# Patient Record
Sex: MALE | Race: WHITE | Employment: FULL TIME | URBAN - METROPOLITAN AREA
[De-identification: names, ages, dates, MRNs, and addresses within clinical notes are randomized per-mention and may not be internally consistent; named-entity substitution may affect disease eponyms.]

---

## 2024-07-17 ENCOUNTER — PROCEDURE VISIT (OUTPATIENT)
Dept: AUDIOLOGY | Age: 54
End: 2024-07-17
Payer: COMMERCIAL

## 2024-07-17 ENCOUNTER — OFFICE VISIT (OUTPATIENT)
Dept: ENT CLINIC | Age: 54
End: 2024-07-17
Payer: COMMERCIAL

## 2024-07-17 VITALS
HEART RATE: 83 BPM | WEIGHT: 160 LBS | DIASTOLIC BLOOD PRESSURE: 92 MMHG | BODY MASS INDEX: 28.35 KG/M2 | HEIGHT: 63 IN | SYSTOLIC BLOOD PRESSURE: 132 MMHG

## 2024-07-17 DIAGNOSIS — H93.8X1 EAR PRESSURE, RIGHT: ICD-10-CM

## 2024-07-17 DIAGNOSIS — H93.13 TINNITUS OF BOTH EARS: ICD-10-CM

## 2024-07-17 DIAGNOSIS — H91.21 SUDDEN RIGHT HEARING LOSS: Primary | ICD-10-CM

## 2024-07-17 DIAGNOSIS — H90.3 ASYMMETRIC SNHL (SENSORINEURAL HEARING LOSS): ICD-10-CM

## 2024-07-17 DIAGNOSIS — H90.3 SENSORINEURAL HEARING LOSS, BILATERAL: Primary | ICD-10-CM

## 2024-07-17 PROCEDURE — 92567 TYMPANOMETRY: CPT | Performed by: AUDIOLOGIST

## 2024-07-17 PROCEDURE — 92557 COMPREHENSIVE HEARING TEST: CPT | Performed by: AUDIOLOGIST

## 2024-07-17 PROCEDURE — 99204 OFFICE O/P NEW MOD 45 MIN: CPT | Performed by: OTOLARYNGOLOGY

## 2024-07-17 RX ORDER — PREDNISONE 20 MG/1
TABLET ORAL
COMMUNITY
Start: 2024-07-16 | End: 2024-07-17 | Stop reason: ALTCHOICE

## 2024-07-17 RX ORDER — PREDNISONE 10 MG/1
TABLET ORAL
Qty: 39 TABLET | Refills: 0 | Status: SHIPPED | OUTPATIENT
Start: 2024-07-17

## 2024-07-17 ASSESSMENT — ENCOUNTER SYMPTOMS
TROUBLE SWALLOWING: 0
SHORTNESS OF BREATH: 0
FACIAL SWELLING: 0
SINUS PRESSURE: 0
COUGH: 0
EYE ITCHING: 0
APNEA: 0
SORE THROAT: 0
VOICE CHANGE: 0

## 2024-07-17 NOTE — PROGRESS NOTES
[facility]  [address], [address]  [place] [address]  P: [phone]       Patient     Barrett Solis  1970    ChiefComplaint     Chief Complaint   Patient presents with    Hearing Problem     Sudden hearing loss in right ear since 7/13. No ear pain or dizziness.       History of Present Illness     Barrett is a 53-year-old male here today with evaluation of sudden hearing loss in 7/13/2024.  States he woke up in the morning with hearing loss in the right ear.  Denies associated vertigo.  Longstanding bilateral tinnitus unchanged.  No preceding URI or head trauma.    Past Medical History     Past Medical History:   Diagnosis Date    Chronic pain     shoulders/lower back    Thyroid disease     Wears glasses        Past Surgical History     Past Surgical History:   Procedure Laterality Date    SHOULDER ARTHROSCOPY Left 12/4/15    WISDOM TOOTH EXTRACTION  2007       Family History     History reviewed. No pertinent family history.    Social History     Social History     Tobacco Use    Smoking status: Never    Smokeless tobacco: Never   Vaping Use    Vaping Use: Never used   Substance Use Topics    Alcohol use: Yes     Alcohol/week: 6.0 standard drinks of alcohol     Types: 6 Cans of beer per week    Drug use: No        Allergies     No Known Allergies    Medications     Current Outpatient Medications   Medication Sig Dispense Refill    predniSONE (DELTASONE) 10 MG tablet 6 tabs x 4 days, 5 tabs x1 day, 4 tabs x1 day, 3 tabs x1 day, 2 tabs x1 day, 1 tab x1 day 39 tablet 0    DICLOFENAC PO Take by mouth 2 times daily      levothyroxine (SYNTHROID) 88 MCG tablet Take 1 tablet by mouth Daily      METHOCARBAMOL PO Take 1,000 mg by mouth daily as needed Muscle spasms/in the morning      Armodafinil (NUVIGIL) 150 MG TABS tablet Take 1 tablet by mouth once a week. Shift work fatigue       No current facility-administered medications for this visit.       Review of Systems     Review of

## 2024-07-17 NOTE — PROGRESS NOTES
Barrett Solis   1970, 53 y.o. male   [id number]       Referring Provider: Xin Melissa DO  Referral Type: In an order in Epic    Reason for Visit: Evaluation of the cause of disorders of hearing, tinnitus, or balance.    ADULT AUDIOLOGIC EVALUATION      Barrett Solis is a 53 y.o. male seen today, 7/17/2024 , for an initial audiologic evaluation.  Patient was seen by Xin Melissa DO following today's evaluation. Patient was alone.    AUDIOLOGIC AND OTHER PERTINENT MEDICAL HISTORY:      Barrett Solis reported sudden hearing loss and \"plugged\" sensation in the right ear beginning on 7/13/2024. He noted pre-existing bilateral tinnitus that worsens as the day progresses. He reports a history of occupational  noise exposure.     Barrett Solis denied dizziness, history of head trauma, history of ear surgery, and family history of hearing loss.    Date: 7/17/2024     IMPRESSIONS:      Tympanometry consistent with normal middle ear pressure and compliance, bilaterally. Abnormal asymmetric hearing sensitivity in the left ear 1,500-8,000 Hz in the left ear and 250-8,000 Hz in the right ear, which can affect listening needs. Word understanding was excellent, bilaterally. Hearing aids are recommended upon medical clearance. Follow medical recommendations of Xin Melissa DO.     ASSESSMENT AND FINDINGS:     Otoscopy revealed: Clear ear canals bilaterally    RIGHT EAR:  Hearing Sensitivity: Moderate to severe sensorineural hearing loss 250-8,000 Hz   Speech Recognition Threshold: 45 dB HL  Word Recognition:Excellent (92%), based on NU-6 25-word list at 75 dBHL with 45 dBHL of masking using recorded speech stimuli.    Tympanometry: Normal peak pressure and compliance, Type A tympanogram, consistent with normal middle ear function.      LEFT EAR:  Hearing Sensitivity: Normal sloping to severe sensorineural hearing loss 250-8,000 Hz  Speech Recognition Threshold: 15 dB HL  Word Recognition: Excellent

## 2024-07-24 ENCOUNTER — TELEPHONE (OUTPATIENT)
Dept: ENT CLINIC | Age: 54
End: 2024-07-24

## 2024-07-24 ENCOUNTER — PROCEDURE VISIT (OUTPATIENT)
Dept: AUDIOLOGY | Age: 54
End: 2024-07-24
Payer: COMMERCIAL

## 2024-07-24 ENCOUNTER — OFFICE VISIT (OUTPATIENT)
Dept: ENT CLINIC | Age: 54
End: 2024-07-24
Payer: COMMERCIAL

## 2024-07-24 VITALS
WEIGHT: 160 LBS | DIASTOLIC BLOOD PRESSURE: 88 MMHG | SYSTOLIC BLOOD PRESSURE: 139 MMHG | BODY MASS INDEX: 28.35 KG/M2 | HEART RATE: 70 BPM | HEIGHT: 63 IN

## 2024-07-24 DIAGNOSIS — H93.13 TINNITUS OF BOTH EARS: ICD-10-CM

## 2024-07-24 DIAGNOSIS — H90.3 SENSORINEURAL HEARING LOSS, BILATERAL: Primary | ICD-10-CM

## 2024-07-24 DIAGNOSIS — H91.21 SUDDEN RIGHT HEARING LOSS: Primary | ICD-10-CM

## 2024-07-24 DIAGNOSIS — H90.3 SENSORINEURAL HEARING LOSS (SNHL) OF BOTH EARS: ICD-10-CM

## 2024-07-24 PROCEDURE — 92567 TYMPANOMETRY: CPT | Performed by: AUDIOLOGIST

## 2024-07-24 PROCEDURE — 92557 COMPREHENSIVE HEARING TEST: CPT | Performed by: AUDIOLOGIST

## 2024-07-24 PROCEDURE — 99214 OFFICE O/P EST MOD 30 MIN: CPT | Performed by: OTOLARYNGOLOGY

## 2024-07-24 ASSESSMENT — ENCOUNTER SYMPTOMS
SORE THROAT: 0
EYE ITCHING: 0
SHORTNESS OF BREATH: 0
VOICE CHANGE: 0
SINUS PRESSURE: 0
APNEA: 0
TROUBLE SWALLOWING: 0
COUGH: 0
FACIAL SWELLING: 0

## 2024-07-24 NOTE — PROGRESS NOTES
Barrett Solis   1970, 53 y.o. male   [id number]       Referring Provider: Xin Melissa DO  Referral Type: In an order in Epic    Reason for Visit: Determination of the effect of medication, surgery, or other treatment.    ADULT AUDIOLOGIC EVALUATION      Barrett Solis is a 53 y.o. male seen today, 7/24/2024 , for a recheck audiologic evaluation.  Patient was seen by Xin Melissa DO following today's evaluation. Patient was alone.    AUDIOLOGIC AND OTHER PERTINENT MEDICAL HISTORY:      Barrett Solis noted tinnitus.  Patient reports significant improvement in right ear hearing since previous audiologic evaluation on 07/17/2024.  He stated bilateral tinnitus has returned to baseline.    Barrett Solis denied otalgia, aural fullness, and dizziness.    Date: 7/24/2024     IMPRESSIONS:      Normal middle ear pressure and compliance, bilaterally.  Abnormal symmetrical hearing loss, bilaterally, which can affect every day listening needs.  Word understanding was excellent presented at elevated sensation levels, bilaterally.  Significant improvement in right ear hearing since previous audiologic evaluation.  Hearing aids are recommended at this time.  Follow medical recommendations of Xin Melissa DO.     ASSESSMENT AND FINDINGS:     Otoscopy revealed: Clear ear canals bilaterally    RIGHT EAR:  Hearing Sensitivity: Normal hearing sensitivity to a severe sensorineural hearing loss from 250-8000 Hz  Speech Recognition Threshold: 15 dB HL  Word Recognition:Excellent (100%), based on NU-6 25-word list at 60 dBHL using recorded speech stimuli.    Tympanometry: Normal peak pressure and compliance, Type A tympanogram, consistent with normal middle ear function.      LEFT EAR:  Hearing Sensitivity: Normal hearing sensitivity to a severe sensorineural hearing loss from 250-8000 Hz  Speech Recognition Threshold: 10 dB HL  Word Recognition: Excellent (96%), based on NU-6 25-word list at 55 dBHL using recorded

## 2024-07-24 NOTE — TELEPHONE ENCOUNTER
Patient called back and stated he would like to ahead get MRI done. Please place order.   Patient wants to go to [facility] in [facility] (fax [phone])     Wife states [employer] insurance will require a prior authorization to be completed. The PA will fall on our office to complete since he wants to go outside of [facility] to have this done.     Wife asks that you keep her updated on progress of PA and let them know when they should call [facility] to schedule appt for MRI     [phone]

## 2024-07-24 NOTE — PROGRESS NOTES
[facility]  [address], [address]  [place] [address]  P: [phone]       Patient     Barrett Solis  1970    ChiefComplaint     Chief Complaint   Patient presents with    Follow-up     Sudden hearing loss. No pain or dizziness.       History of Present Illness     Barrett is a 53-year-old male here today for follow-up regarding sudden right-sided hearing loss.  States 3 days ago hearing fully returned.  Completed oral steroids without complication.  Returned to baseline hearing loss and bilateral tinnitus.    Past Medical History     Past Medical History:   Diagnosis Date    Chronic pain     shoulders/lower back    Thyroid disease     Wears glasses        Past Surgical History     Past Surgical History:   Procedure Laterality Date    SHOULDER ARTHROSCOPY Left 12/4/15    WISDOM TOOTH EXTRACTION  2007       Family History     History reviewed. No pertinent family history.    Social History     Social History     Tobacco Use    Smoking status: Never    Smokeless tobacco: Never   Vaping Use    Vaping Use: Never used   Substance Use Topics    Alcohol use: Yes     Alcohol/week: 6.0 standard drinks of alcohol     Types: 6 Cans of beer per week    Drug use: No        Allergies     No Known Allergies    Medications     Current Outpatient Medications   Medication Sig Dispense Refill    predniSONE (DELTASONE) 10 MG tablet 6 tabs x 4 days, 5 tabs x1 day, 4 tabs x1 day, 3 tabs x1 day, 2 tabs x1 day, 1 tab x1 day 39 tablet 0    DICLOFENAC PO Take by mouth 2 times daily      levothyroxine (SYNTHROID) 88 MCG tablet Take 1 tablet by mouth Daily      METHOCARBAMOL PO Take 1,000 mg by mouth daily as needed Muscle spasms/in the morning      Armodafinil (NUVIGIL) 150 MG TABS tablet Take 1 tablet by mouth once a week. Shift work fatigue       No current facility-administered medications for this visit.       Review of Systems     Review of Systems   Constitutional:  Negative for appetite change,

## 2024-07-25 NOTE — TELEPHONE ENCOUNTER
Order and OV notes faxed to [facility] in [facility] (fax [phone])